# Patient Record
Sex: FEMALE | Race: WHITE | NOT HISPANIC OR LATINO | URBAN - METROPOLITAN AREA
[De-identification: names, ages, dates, MRNs, and addresses within clinical notes are randomized per-mention and may not be internally consistent; named-entity substitution may affect disease eponyms.]

---

## 2017-01-04 ENCOUNTER — Encounter (OUTPATIENT)
Dept: URBAN - METROPOLITAN AREA CLINIC 51 | Facility: CLINIC | Age: 77
End: 2017-01-04
Payer: MEDICARE

## 2017-01-04 DIAGNOSIS — Z01.818 ENCOUNTER FOR OTHER PREPROCEDURAL EXAMINATION: Primary | ICD-10-CM

## 2017-01-04 PROCEDURE — 92025 CPTRIZED CORNEAL TOPOGRAPHY: CPT | Performed by: OPTOMETRIST

## 2017-01-04 PROCEDURE — 99213 OFFICE O/P EST LOW 20 MIN: CPT | Performed by: PHYSICIAN ASSISTANT

## 2017-01-04 RX ORDER — ROSUVASTATIN CALCIUM 5 MG/1
5 MG TABLET, FILM COATED ORAL
Qty: 0 | Refills: 0 | Status: ACTIVE
Start: 2017-01-04

## 2017-01-04 RX ORDER — EZETIMIBE 10 MG/1
10 MG TABLET ORAL
Qty: 0 | Refills: 0 | Status: ACTIVE
Start: 2017-01-04

## 2017-01-10 ENCOUNTER — FOLLOW UP ESTABLISHED (OUTPATIENT)
Dept: URBAN - METROPOLITAN AREA CLINIC 44 | Facility: CLINIC | Age: 77
End: 2017-01-10
Payer: MEDICARE

## 2017-01-10 DIAGNOSIS — H25.13 AGE-RELATED NUCLEAR CATARACT, BILATERAL: Primary | ICD-10-CM

## 2017-01-10 PROCEDURE — 99213 OFFICE O/P EST LOW 20 MIN: CPT | Performed by: OPHTHALMOLOGY

## 2017-01-10 RX ORDER — PREDNISOLONE ACETATE 10 MG/ML
1 % SUSPENSION/ DROPS OPHTHALMIC
Qty: 1 | Refills: 2 | Status: ACTIVE
Start: 2017-01-10

## 2017-01-10 RX ORDER — DICLOFENAC SODIUM 1 MG/ML
0.1 % SOLUTION/ DROPS OPHTHALMIC
Qty: 1 | Refills: 2 | Status: INACTIVE
Start: 2017-01-10 | End: 2017-01-13

## 2017-01-10 RX ORDER — OFLOXACIN 3 MG/ML
0.3 % SOLUTION/ DROPS OPHTHALMIC
Qty: 1 | Refills: 2 | Status: INACTIVE
Start: 2017-01-10 | End: 2017-01-13

## 2017-01-10 ASSESSMENT — INTRAOCULAR PRESSURE
OS: 9
OD: 9

## 2017-01-10 ASSESSMENT — VISUAL ACUITY
OD: 20/30
OS: 20/30

## 2017-01-17 ENCOUNTER — SURGERY (OUTPATIENT)
Dept: URBAN - METROPOLITAN AREA SURGERY 19 | Facility: SURGERY | Age: 77
End: 2017-01-17
Payer: MEDICARE

## 2017-01-17 PROCEDURE — ATVSY ADVANCED TOTAL VISION SYMFONY: CUSTOM | Performed by: OPHTHALMOLOGY

## 2017-01-18 ENCOUNTER — POST OP (OUTPATIENT)
Dept: URBAN - METROPOLITAN AREA CLINIC 51 | Facility: CLINIC | Age: 77
End: 2017-01-18

## 2017-01-18 DIAGNOSIS — Z96.1 PRESENCE OF INTRAOCULAR LENS: Primary | ICD-10-CM

## 2017-01-18 PROCEDURE — 99024 POSTOP FOLLOW-UP VISIT: CPT | Performed by: OPTOMETRIST

## 2017-01-18 ASSESSMENT — INTRAOCULAR PRESSURE: OD: 15

## 2017-01-24 ENCOUNTER — POST OP (OUTPATIENT)
Dept: URBAN - METROPOLITAN AREA CLINIC 51 | Facility: CLINIC | Age: 77
End: 2017-01-24

## 2017-01-24 PROCEDURE — 99024 POSTOP FOLLOW-UP VISIT: CPT | Performed by: OPTOMETRIST

## 2017-01-24 ASSESSMENT — VISUAL ACUITY
OS: 20/30
OD: 20/20

## 2017-01-24 ASSESSMENT — INTRAOCULAR PRESSURE
OS: 12
OD: 11

## 2017-01-31 ENCOUNTER — SURGERY (OUTPATIENT)
Dept: URBAN - METROPOLITAN AREA SURGERY 19 | Facility: SURGERY | Age: 77
End: 2017-01-31
Payer: MEDICARE

## 2017-01-31 PROCEDURE — ATVSY ADVANCED TOTAL VISION SYMFONY: CUSTOM | Performed by: OPHTHALMOLOGY

## 2017-02-01 ENCOUNTER — POST OP (OUTPATIENT)
Dept: URBAN - METROPOLITAN AREA CLINIC 51 | Facility: CLINIC | Age: 77
End: 2017-02-01

## 2017-02-01 PROCEDURE — 99024 POSTOP FOLLOW-UP VISIT: CPT | Performed by: OPTOMETRIST

## 2017-02-01 ASSESSMENT — INTRAOCULAR PRESSURE: OS: 12

## 2023-07-11 ENCOUNTER — HOSPITAL ENCOUNTER (EMERGENCY)
Facility: CLINIC | Age: 83
Discharge: HOME | End: 2023-07-11
Admitting: EMERGENCY MEDICINE
Payer: COMMERCIAL

## 2023-07-11 ENCOUNTER — APPOINTMENT (OUTPATIENT)
Dept: GENERAL RADIOLOGY | Facility: CLINIC | Age: 83
End: 2023-07-11
Attending: EMERGENCY MEDICINE
Payer: COMMERCIAL

## 2023-07-11 VITALS
DIASTOLIC BLOOD PRESSURE: 96 MMHG | HEART RATE: 89 BPM | SYSTOLIC BLOOD PRESSURE: 157 MMHG | RESPIRATION RATE: 17 BRPM | OXYGEN SATURATION: 96 % | TEMPERATURE: 97.7 F

## 2023-07-11 DIAGNOSIS — R00.2 PALPITATIONS: ICD-10-CM

## 2023-07-11 LAB
ANION GAP SERPL CALCULATED.3IONS-SCNC: 10 MMOL/L (ref 7–15)
ATRIAL RATE - MUSE: 56 BPM
BASOPHILS # BLD AUTO: 0.1 10E3/UL (ref 0–0.2)
BASOPHILS NFR BLD AUTO: 1 %
BUN SERPL-MCNC: 19.9 MG/DL (ref 8–23)
CALCIUM SERPL-MCNC: 9.4 MG/DL (ref 8.8–10.2)
CHLORIDE SERPL-SCNC: 105 MMOL/L (ref 98–107)
CREAT SERPL-MCNC: 0.9 MG/DL (ref 0.51–0.95)
DEPRECATED HCO3 PLAS-SCNC: 22 MMOL/L (ref 22–29)
DIASTOLIC BLOOD PRESSURE - MUSE: NORMAL MMHG
EOSINOPHIL # BLD AUTO: 0.2 10E3/UL (ref 0–0.7)
EOSINOPHIL NFR BLD AUTO: 4 %
ERYTHROCYTE [DISTWIDTH] IN BLOOD BY AUTOMATED COUNT: 12.9 % (ref 10–15)
GFR SERPL CREATININE-BSD FRML MDRD: 63 ML/MIN/1.73M2
GLUCOSE SERPL-MCNC: 92 MG/DL (ref 70–99)
HCT VFR BLD AUTO: 43.3 % (ref 35–47)
HGB BLD-MCNC: 13.9 G/DL (ref 11.7–15.7)
HOLD SPECIMEN: NORMAL
HOLD SPECIMEN: NORMAL
IMM GRANULOCYTES # BLD: 0 10E3/UL
IMM GRANULOCYTES NFR BLD: 0 %
INTERPRETATION ECG - MUSE: NORMAL
LYMPHOCYTES # BLD AUTO: 1.8 10E3/UL (ref 0.8–5.3)
LYMPHOCYTES NFR BLD AUTO: 29 %
MCH RBC QN AUTO: 33.4 PG (ref 26.5–33)
MCHC RBC AUTO-ENTMCNC: 32.1 G/DL (ref 31.5–36.5)
MCV RBC AUTO: 104 FL (ref 78–100)
MONOCYTES # BLD AUTO: 0.7 10E3/UL (ref 0–1.3)
MONOCYTES NFR BLD AUTO: 12 %
NEUTROPHILS # BLD AUTO: 3.3 10E3/UL (ref 1.6–8.3)
NEUTROPHILS NFR BLD AUTO: 54 %
NRBC # BLD AUTO: 0 10E3/UL
NRBC BLD AUTO-RTO: 0 /100
P AXIS - MUSE: 24 DEGREES
PLATELET # BLD AUTO: 195 10E3/UL (ref 150–450)
POTASSIUM SERPL-SCNC: 4.6 MMOL/L (ref 3.4–5.3)
PR INTERVAL - MUSE: 174 MS
QRS DURATION - MUSE: 76 MS
QT - MUSE: 392 MS
QTC - MUSE: 378 MS
R AXIS - MUSE: 17 DEGREES
RBC # BLD AUTO: 4.16 10E6/UL (ref 3.8–5.2)
SODIUM SERPL-SCNC: 137 MMOL/L (ref 136–145)
SYSTOLIC BLOOD PRESSURE - MUSE: NORMAL MMHG
T AXIS - MUSE: 55 DEGREES
TROPONIN T SERPL HS-MCNC: 12 NG/L
TROPONIN T SERPL HS-MCNC: 13 NG/L
VENTRICULAR RATE- MUSE: 56 BPM
WBC # BLD AUTO: 6 10E3/UL (ref 4–11)

## 2023-07-11 PROCEDURE — 85004 AUTOMATED DIFF WBC COUNT: CPT | Performed by: EMERGENCY MEDICINE

## 2023-07-11 PROCEDURE — 99285 EMERGENCY DEPT VISIT HI MDM: CPT | Mod: 25

## 2023-07-11 PROCEDURE — 80048 BASIC METABOLIC PNL TOTAL CA: CPT | Performed by: EMERGENCY MEDICINE

## 2023-07-11 PROCEDURE — 36415 COLL VENOUS BLD VENIPUNCTURE: CPT | Performed by: EMERGENCY MEDICINE

## 2023-07-11 PROCEDURE — 84484 ASSAY OF TROPONIN QUANT: CPT | Mod: 91 | Performed by: EMERGENCY MEDICINE

## 2023-07-11 PROCEDURE — 93005 ELECTROCARDIOGRAM TRACING: CPT

## 2023-07-11 PROCEDURE — 71046 X-RAY EXAM CHEST 2 VIEWS: CPT

## 2023-07-11 PROCEDURE — 84484 ASSAY OF TROPONIN QUANT: CPT | Performed by: EMERGENCY MEDICINE

## 2023-07-11 ASSESSMENT — ACTIVITIES OF DAILY LIVING (ADL)
ADLS_ACUITY_SCORE: 35

## 2023-07-11 NOTE — ED TRIAGE NOTES
"Pt complains of \"chest heaviness\" that became more frequent yesterday and has not gone away today. Pt has hx of triple bipass 4 years ago. Pt complains of sob.       "

## 2023-07-11 NOTE — DISCHARGE INSTRUCTIONS
We have done an EKG and lab work to assess the heart and everything we have done is normal.  If you have a constant rapid pulse that is unrelenting and over 100 consistently return to the emergency room for reassessment if you have chest pain or shortness of breath with activity return to the emergency room.  If you have ongoing a irregular pulses please follow-up with your regular doctor consider repeat on the Holter monitor.  We have discussed blood clots in the lungs but due to your 6-hour wait your lack of symptoms at this time we will avoid further assessment for now.

## 2023-07-11 NOTE — ED PROVIDER NOTES
History     Chief Complaint:  Chest Pain and Shortness of Breath       HPI   Corin Prince is a 83 year old female who presents with concerns for rapid pulse and vague chest pain.  Patient has had left parasternal chest tightness off and on as well as have palpitations and feeling her heart rate is elevated.  Patient has not felt short of breath with activity no calf pain or swelling no cough.  Chest pain is not triggered with exertion neither palpitations.  Patient felt her pulse was regular and rapid and presents to the emergency room for assessment.      Independent Historian:   None - Patient Only    Review of External Notes:          Medications:    ondansetron (ZOFRAN) 4 MG tablet        Past Medical History:    Past Medical History:   Diagnosis Date     Hypercholesteremia        Past Surgical History:    No past surgical history on file.     Physical Exam     Patient Vitals for the past 24 hrs:   BP Temp Temp src Pulse Resp SpO2   07/11/23 1249 (!) 157/96 -- -- 89 -- 96 %   07/11/23 1149 (!) 143/87 97.7  F (36.5  C) Oral 63 17 97 %        Physical Exam  Vitals reviewed.   HENT:      Head: Normocephalic.   Cardiovascular:      Rate and Rhythm: Normal rate and regular rhythm.      Heart sounds: Normal heart sounds.   Pulmonary:      Effort: Pulmonary effort is normal.      Breath sounds: Normal breath sounds.   Abdominal:      General: Bowel sounds are normal.      Palpations: Abdomen is soft.   Musculoskeletal:         General: Normal range of motion.   Skin:     General: Skin is warm.      Capillary Refill: Capillary refill takes less than 2 seconds.   Neurological:      General: No focal deficit present.      Mental Status: She is alert and oriented to person, place, and time.   Psychiatric:         Mood and Affect: Mood normal.           Emergency Department Course   ECG  ECG taken at 12:02, ECG read at 1208    Rate 56 bpm. NM interval 174 ms. QRS duration 76 ms. QT/QTc 392/378 ms. sinus  bradycardia.    Imaging:  Chest XR,  PA & LAT   Final Result   IMPRESSION: PA and lateral views of the chest were obtained.   Postsurgical changes of cardiac surgery with median sternotomy wires   and surgical clips. Cardiomediastinal silhouette is within normal   limits. No suspicious focal pulmonary opacities. No significant   pleural effusion or pneumothorax.      JEFFREY LARA MD            SYSTEM ID:  [id number]         Report per radiology    Laboratory:  Labs Ordered and Resulted from Time of ED Arrival to Time of ED Departure   CBC WITH PLATELETS AND DIFFERENTIAL - Abnormal       Result Value    WBC Count 6.0      RBC Count 4.16      Hemoglobin 13.9      Hematocrit 43.3       (*)     MCH 33.4 (*)     MCHC 32.1      RDW 12.9      Platelet Count 195      % Neutrophils 54      % Lymphocytes 29      % Monocytes 12      % Eosinophils 4      % Basophils 1      % Immature Granulocytes 0      NRBCs per 100 WBC 0      Absolute Neutrophils 3.3      Absolute Lymphocytes 1.8      Absolute Monocytes 0.7      Absolute Eosinophils 0.2      Absolute Basophils 0.1      Absolute Immature Granulocytes 0.0      Absolute NRBCs 0.0     TROPONIN T, HIGH SENSITIVITY - Normal    Troponin T, High Sensitivity 12     BASIC METABOLIC PANEL - Normal    Sodium 137      Potassium 4.6      Chloride 105      Carbon Dioxide (CO2) 22      Anion Gap 10      Urea Nitrogen 19.9      Creatinine 0.90      Calcium 9.4      Glucose 92      GFR Estimate 63     TROPONIN T, HIGH SENSITIVITY - Normal    Troponin T, High Sensitivity 13            Emergency Department Course & Assessments:             Interventions:  Medications - No data to display     Assessments:  \  Independent Interpretation (X-rays, CTs, rhythm strip):  None    Consultations/Discussion of Management or Tests:  None        Social Determinants of Health affecting care:   None    Disposition:  The patient was discharged to home.     Impression & Plan        Medical Decision  Making:  Presents with chest tightness and palpitations.  EKG shows sinus rhythm without arrhythmia.  Heart rate was as high as 120 according to her cannot rule out A-fib but EKG on arrival is sinus rhythm.  Patient did not have a work-up for PE performed however on their my arrival to the room patient has been waiting 6 hours in the emergency room waiting room to be seen.  We did discuss moving forward with a further assessment due to patient's length of time lack of symptoms and symptom resolution recommend follow-up with primary care to discuss Holter monitoring or further cardiac work-up and return with constant unrelenting symptoms patient was in agreement and was discharged home in stable condition.    Critical Care time:  was 0 minutes for this patient excluding procedures.    Diagnosis:    ICD-10-CM    1. Palpitations  R00.2            Discharge Medications:  Discharge Medication List as of 7/11/2023  6:12 PM             Daniel Guaman MD  7/11/2023   No att. providers found        Daniel Guaman MD  07/15/23 8016

## 2023-07-11 NOTE — ED NOTES
Tele-PIT/Intake Evaluation      Video-Visit Details    Type of service:  Video Visit    Video Start Time (time video started): 12:36 PM  Video End Time (time video stopped): 12:43 PM   Originating Location (pt. Location): [facility]  Distant Location (provider location):  same  Mode of Communication:  Video Conference via [employer]  Patient verbally consented to [employer] televisit.    History:  83-year-old female presenting with chest heaviness and shortness of breath since yesterday.  She is also felt chilled at times.  She denies any significant cough.  She denies any diaphoresis or fever.  She has a history of a triple bypass several years ago while in [place].  She has been taking all her medications as prescribed..    Exam:  Physical Exam  Vitals and nursing note reviewed.   Constitutional:       General: She is not in acute distress.     Appearance: She is not ill-appearing.   HENT:      Head: Normocephalic and atraumatic.      Right Ear: External ear normal.      Left Ear: External ear normal.      Nose: Nose normal.   Eyes:      Extraocular Movements: Extraocular movements intact.      Conjunctiva/sclera: Conjunctivae normal.   Pulmonary:      Effort: Pulmonary effort is normal. No respiratory distress.   Musculoskeletal:         General: No deformity or signs of injury.   Skin:     Coloration: Skin is not pale.      Findings: No rash.   Neurological:      Mental Status: She is alert.   Psychiatric:         Behavior: Behavior normal.         Patient Vitals for the past 24 hrs:   BP Temp Temp src Pulse Resp SpO2   07/11/23 1149 (!) 143/87 97.7  F (36.5  C) Oral 63 17 97 %       Appropriate interventions for symptom management were initiated if applicable.  Appropriate diagnostic tests were initiated if indicated.    Important information for subsequent clinician:  Cardiac w/u ordered.    I briefly evaluated the patient and developed an initial plan of care. I discussed this plan and  explained that this brief interaction does not constitute a full evaluation. Patient/family understands that they should wait to be fully evaluated and discuss any test results with another clinician prior to leaving the hospital.     Daryn Johnson MD  07/11/23 [id number]